# Patient Record
Sex: MALE | Race: AMERICAN INDIAN OR ALASKA NATIVE | HISPANIC OR LATINO | Employment: OTHER | URBAN - METROPOLITAN AREA
[De-identification: names, ages, dates, MRNs, and addresses within clinical notes are randomized per-mention and may not be internally consistent; named-entity substitution may affect disease eponyms.]

---

## 2018-06-12 ENCOUNTER — HOSPITAL ENCOUNTER (INPATIENT)
Facility: HOSPITAL | Age: 53
LOS: 1 days | Discharge: HOME | DRG: 434 | End: 2018-06-13
Attending: HOSPITALIST | Admitting: HOSPITALIST

## 2018-06-12 DIAGNOSIS — K76.82 HEPATIC ENCEPHALOPATHY: ICD-10-CM

## 2018-06-12 LAB
ALBUMIN SERPL BCP-MCNC: 2.7 G/DL
ALP SERPL-CCNC: 113 U/L
ALT SERPL W/O P-5'-P-CCNC: 77 U/L
AMMONIA PLAS-SCNC: 62 UMOL/L
ANION GAP SERPL CALC-SCNC: 6 MMOL/L
AST SERPL-CCNC: 91 U/L
BASOPHILS # BLD AUTO: 0.05 K/UL
BASOPHILS NFR BLD: 0.8 %
BILIRUB SERPL-MCNC: 4.9 MG/DL
BUN SERPL-MCNC: 18 MG/DL
CALCIUM SERPL-MCNC: 8.8 MG/DL
CHLORIDE SERPL-SCNC: 107 MMOL/L
CO2 SERPL-SCNC: 24 MMOL/L
CREAT SERPL-MCNC: 0.9 MG/DL
DIFFERENTIAL METHOD: ABNORMAL
EOSINOPHIL # BLD AUTO: 0.2 K/UL
EOSINOPHIL NFR BLD: 3.7 %
ERYTHROCYTE [DISTWIDTH] IN BLOOD BY AUTOMATED COUNT: 14.9 %
EST. GFR  (AFRICAN AMERICAN): >60 ML/MIN/1.73 M^2
EST. GFR  (NON AFRICAN AMERICAN): >60 ML/MIN/1.73 M^2
GLUCOSE SERPL-MCNC: 104 MG/DL
HCT VFR BLD AUTO: 37.2 %
HGB BLD-MCNC: 13.6 G/DL
IMM GRANULOCYTES # BLD AUTO: 0.03 K/UL
IMM GRANULOCYTES NFR BLD AUTO: 0.5 %
INR PPP: 1.2
LYMPHOCYTES # BLD AUTO: 1.9 K/UL
LYMPHOCYTES NFR BLD: 29.6 %
MAGNESIUM SERPL-MCNC: 2.1 MG/DL
MCH RBC QN AUTO: 34.6 PG
MCHC RBC AUTO-ENTMCNC: 36.6 G/DL
MCV RBC AUTO: 95 FL
MONOCYTES # BLD AUTO: 1 K/UL
MONOCYTES NFR BLD: 15.5 %
NEUTROPHILS # BLD AUTO: 3.2 K/UL
NEUTROPHILS NFR BLD: 49.9 %
NRBC BLD-RTO: 0 /100 WBC
PHOSPHATE SERPL-MCNC: 3.5 MG/DL
PLATELET # BLD AUTO: 140 K/UL
PMV BLD AUTO: 9.3 FL
POTASSIUM SERPL-SCNC: 3.3 MMOL/L
PROT SERPL-MCNC: 6.6 G/DL
PROTHROMBIN TIME: 12.6 SEC
RBC # BLD AUTO: 3.93 M/UL
SODIUM SERPL-SCNC: 137 MMOL/L
WBC # BLD AUTO: 6.45 K/UL

## 2018-06-12 PROCEDURE — 99223 1ST HOSP IP/OBS HIGH 75: CPT | Mod: ,,, | Performed by: HOSPITALIST

## 2018-06-12 PROCEDURE — 80053 COMPREHEN METABOLIC PANEL: CPT

## 2018-06-12 PROCEDURE — 11000001 HC ACUTE MED/SURG PRIVATE ROOM

## 2018-06-12 PROCEDURE — 25000003 PHARM REV CODE 250: Performed by: HOSPITALIST

## 2018-06-12 PROCEDURE — 87086 URINE CULTURE/COLONY COUNT: CPT

## 2018-06-12 PROCEDURE — 36415 COLL VENOUS BLD VENIPUNCTURE: CPT

## 2018-06-12 PROCEDURE — 85025 COMPLETE CBC W/AUTO DIFF WBC: CPT

## 2018-06-12 PROCEDURE — 83735 ASSAY OF MAGNESIUM: CPT

## 2018-06-12 PROCEDURE — 85610 PROTHROMBIN TIME: CPT

## 2018-06-12 PROCEDURE — 82140 ASSAY OF AMMONIA: CPT

## 2018-06-12 PROCEDURE — 84100 ASSAY OF PHOSPHORUS: CPT

## 2018-06-12 PROCEDURE — 87040 BLOOD CULTURE FOR BACTERIA: CPT

## 2018-06-12 RX ORDER — PANTOPRAZOLE SODIUM 40 MG/1
40 TABLET, DELAYED RELEASE ORAL DAILY
Status: DISCONTINUED | OUTPATIENT
Start: 2018-06-13 | End: 2018-06-13 | Stop reason: HOSPADM

## 2018-06-12 RX ORDER — LACTULOSE 10 G/15ML
30 SOLUTION ORAL 3 TIMES DAILY
Status: DISCONTINUED | OUTPATIENT
Start: 2018-06-12 | End: 2018-06-13 | Stop reason: HOSPADM

## 2018-06-12 RX ORDER — ACETAMINOPHEN 500 MG
500 TABLET ORAL DAILY PRN
COMMUNITY

## 2018-06-12 RX ORDER — PROPRANOLOL HYDROCHLORIDE 20 MG/1
20 TABLET ORAL DAILY
COMMUNITY

## 2018-06-12 RX ORDER — LACTULOSE 10 G/15ML
30 SOLUTION ORAL; RECTAL DAILY
COMMUNITY

## 2018-06-12 RX ORDER — POTASSIUM CHLORIDE 20 MEQ/1
40 TABLET, EXTENDED RELEASE ORAL ONCE
Status: COMPLETED | OUTPATIENT
Start: 2018-06-12 | End: 2018-06-12

## 2018-06-12 RX ORDER — PANTOPRAZOLE SODIUM 40 MG/1
40 TABLET, DELAYED RELEASE ORAL DAILY
COMMUNITY

## 2018-06-12 RX ADMIN — RIFAXIMIN 550 MG: 550 TABLET ORAL at 12:06

## 2018-06-12 RX ADMIN — POTASSIUM CHLORIDE 40 MEQ: 1500 TABLET, EXTENDED RELEASE ORAL at 06:06

## 2018-06-12 RX ADMIN — LACTULOSE 30 G: 20 SOLUTION ORAL at 08:06

## 2018-06-12 RX ADMIN — LACTULOSE 30 G: 20 SOLUTION ORAL at 02:06

## 2018-06-12 NOTE — PLAN OF CARE
Problem: Patient Care Overview  Goal: Plan of Care Review  Outcome: Ongoing (interventions implemented as appropriate)  Plan of care reviewed and updated. Pt AA+O. Pt's pain is managed with the medication ordered at this time. Pt's VS are as charted.  No falls this shift. Pt is oriented to room and call system. Will continue to [facility].

## 2018-06-12 NOTE — H&P
Hospital Medicine  History and Physical Exam    Team: [facility]OS[facility] MED  Maco Ascencio MD  Admit Date: 6/12/2018  VALERIA 6/14/2018  Principal Problem:  Hepatic encephalopathy   Patient information was obtained from patient, spouse/SO, past medical records and ER records.   Primary care Physician: Primary Doctor No  Code status: Full Code    HPI:     52 y/o M with a PMH Of ETOH cirrhosis with TIPS placement presents as a transfer from our [facility] of [facility] in [place], [place] where he presented with altered mental status and confusion. According to his wife he left [place] on June 2nd and his crew noticed him to be more confused when she talk to him on the phone. He then instructed him to head to shore so he could be admitted in a Hospital. His crew landed in [facility] where he was taken to [facility] [place] [facility] in [place], [place]. He has not been compliant with [place]ulose [place] sea.     He has been a  for the past 35 years but has not been working after he was hospitalized last February for a massive esophageal Bleed in [facility] in [place], [place] where he currently resides. He underwent emergent EGD as well as a TIPS procedure. He was discharged with Propanolol daily, Lactulose and Pantoprazole. He reports he has never had abdominal swelling and has never been tapped. He denies any fevers, chills or abdominal pain. Reports feeling much better after taking his lactulose. He is followed by Dr. Enriquez in [facility] in [place], [place]. He was told he would need to be sober for at least 6 months prior to being considered for transplant.       Past Medical History: Patient has no past medical history on file.    Past Surgical History: Patient has no past surgical history on file.    Social History: Patient reports that he has quit smoking. His smoking use included Cigarettes. He has quit using smokeless tobacco.    Family History: family history is not on  file.    Medications:   Prior to Admission medications    Medication Sig Start Date End Date Taking? Authorizing Provider   acetaminophen (TYLENOL) 500 MG tablet Take 500 mg by mouth daily as needed for Pain.   Yes Historical Provider, MD   lactulose (CHRONULAC) 10 gram/15 mL solution Take 30 mLs by mouth once daily.   Yes Historical Provider, MD   pantoprazole (PROTONIX) 40 MG tablet Take 40 mg by mouth once daily.   Yes Historical Provider, MD   propranolol (INDERAL) 20 MG tablet Take 20 mg by mouth once daily.   Yes Historical Provider, MD       Allergies: Patient has No Known Allergies.    ROS  Constitutional: no fever or chills  Respiratory: no cough or shortness of breath  Cardiovascular: no chest pain or palpitations  Gastrointestinal: no nausea or vomiting, no abdominal pain or change in bowel habits  Genitourinary: no hematuria or dysuria  Integument/Breast: no rash or pruritis  Hematologic/Lymphatic: no easy bruising or lymphadenopathy  Musculoskeletal: no arthralgias or myalgias  Neurological: no seizures or tremors  Behavioral/Psych: no depression or anxiety    PEx  Temp:  [96.9 °F (36.1 °C)-98.3 °F (36.8 °C)]   Pulse:  [61-75]   Resp:  [16-18]   BP: (121-149)/(62-79)   SpO2:  [99 %-100 %]   There is no height or weight on file to calculate BMI.     General appearance: no distress, non toxic   Mental status: Alert and oriented x 3  HEENT:  conjunctivae/corneas clear, PERRL  Neck: supple, thyroid not enlarged  Pulm:   normal respiratory effort, CTA B, no c/w/r  Card: RRR, S1, S2 normal, no murmur, click, rub or gallop  Abd: soft, NT, ND, BS present; no masses, no organomegaly  Ext: no c/c/e  Pulses: 2+, symmetric  Skin: color, texture, turgor normal. No rashes or lesions  Neuro: CN II-XII grossly intact, no focal numbness or weakness, normal strength and tone         Intake/Output Summary (Last 24 hours) at 06/12/18 8188  Last data filed at 06/12/18 1735   Gross per 24 hour   Intake             1440 ml    Output                0 ml   Net             1440 ml       Recent Labs  Lab 06/12/18  1030   WBC 6.45   HGB 13.6*   HCT 37.2*   *       Recent Labs  Lab 06/12/18  1030      K 3.3*      CO2 24   BUN 18   CREATININE 0.9      CALCIUM 8.8   MG 2.1   PHOS 3.5       Recent Labs  Lab 06/12/18  1030   ALKPHOS 113   ALT 77*   AST 91*   ALBUMIN 2.7*   PROT 6.6   BILITOT 4.9*      No results for input(s): POCTGLUCOSE in the last 168 hours.  No results for input(s): CPK, CPKMB, MB, TROPONINI in the last 72 hours.    No results for input(s): LACTATE in the last 72 hours.     Active Hospital Problems    Diagnosis  POA    *Hepatic encephalopathy [K72.90]  Yes     Priority: 1 - High      Resolved Hospital Problems    Diagnosis Date Resolved POA   No resolved problems to display.       Overview    52 yo M with a PMH of ETOH cirrhosis, Esophageal bleeding presents from OSH with confusion and altered mental status after non compliance with lactulose    Assessment and Plan for Problems addressed today:    Hepatic encephalopathy  - most likely due to non compliance with lactulose  - Ammonia levels 62 on admission  - restart lactulose   - no asterixis on physical examination    Decompensated Liver Cirrhosis  - most likely due to ETOH  - s/p TIPS on 02/2018  - US Liver for tips revision  - Bilirubin at baseline  - calculate MELD Score based on INR tomorrow  - Hepatology consult  - cont PPI for hx of EV bleed    Hypokalemia  - supplemented with po potassium       DVT PPx:   SAROJ/SCD  Early mobilization      Provider  Maco Arevalo MD  Staff Hospitalist  Department of Hospital Medicine  [facility]   [phone]

## 2018-06-12 NOTE — PLAN OF CARE
Please call extension [phone] (if nobody answers, this will flip to a beeper, so put in your call back number) upon patient arrival to floor for Hospital Medicine admit team assignment and for additional admit orders for the patient.  Do not page the attending, staff physician associate with the patient on arrival (may not be in-house at the time of arrival).  Rather, always call [phone] to reach the triage physician for orders and team assignment.        Outside Transfer Acceptance Note     Patients name:      Transferring Physician or Mid-Level provider/Clinic giving report:        Accepting Physician for admission to hospital: Patricio Duff MD        Date of acceptance:  06/12/2018       Reason for transfer:  hepatology availability     Report from Physician/Mid-Level Provider:    HPI: 53M with cirrosis and TIPS presented to [facility] ED for increasing confusion.  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  NH3 158.  He is alert but slowed and confused.  All of his care is in [place].    VS: T 98.3, HR 63, R 16, 100% RA, 149/79    Labs: NH3 158, bili 5.7, K 2.9, lipase 296    Radiographs: CT abdomen with cirrhotic liver w/ patent TIPS, gallstones in gallbladder without biliary ductal dilation    To Do List upon arrival:  Treat with lactulose, trend CMP (baseline unknown)     Please call extension [phone] (if nobody answers, this will flip to a beeper, so put in your call back number) upon patient arrival to floor for Hospital Medicine admit team assignment and for additional admit orders for the patient.  Do not page the attending, staff physician associate with the patient on arrival (may not be in-house at the time of arrival).  Rather, always call [phone] to reach the triage physician for orders and team assignment.

## 2018-06-12 NOTE — NURSING
Pt arrived to room  via stetcher from [facility]. Pt AAO x 4. Pt denies pain. . Pt denies nausea. .  Pt oriented to room and call system . Will continue to monitor .

## 2018-06-12 NOTE — NURSING
Received phone call from  of the [facility] patient has positive blood culture for gram positive cocci. Hospital faxing over results. Dr keller to notify

## 2018-06-13 VITALS
RESPIRATION RATE: 16 BRPM | SYSTOLIC BLOOD PRESSURE: 119 MMHG | TEMPERATURE: 96 F | HEART RATE: 78 BPM | OXYGEN SATURATION: 100 % | DIASTOLIC BLOOD PRESSURE: 64 MMHG

## 2018-06-13 PROBLEM — K72.90 DECOMPENSATED HEPATIC CIRRHOSIS: Chronic | Status: ACTIVE | Noted: 2018-06-13

## 2018-06-13 PROBLEM — K74.60 DECOMPENSATED HEPATIC CIRRHOSIS: Chronic | Status: ACTIVE | Noted: 2018-06-13

## 2018-06-13 LAB
ALBUMIN SERPL BCP-MCNC: 2.4 G/DL
ALP SERPL-CCNC: 130 U/L
ALT SERPL W/O P-5'-P-CCNC: 74 U/L
ANION GAP SERPL CALC-SCNC: 5 MMOL/L
AST SERPL-CCNC: 90 U/L
BACTERIA UR CULT: NORMAL
BACTERIA UR CULT: NORMAL
BASOPHILS # BLD AUTO: 0.03 K/UL
BASOPHILS NFR BLD: 0.6 %
BILIRUB SERPL-MCNC: 3 MG/DL
BUN SERPL-MCNC: 12 MG/DL
CALCIUM SERPL-MCNC: 8.7 MG/DL
CHLORIDE SERPL-SCNC: 109 MMOL/L
CO2 SERPL-SCNC: 24 MMOL/L
CREAT SERPL-MCNC: 0.8 MG/DL
CRP SERPL-MCNC: 3.78 MG/L
DIFFERENTIAL METHOD: ABNORMAL
EOSINOPHIL # BLD AUTO: 0.2 K/UL
EOSINOPHIL NFR BLD: 3 %
ERYTHROCYTE [DISTWIDTH] IN BLOOD BY AUTOMATED COUNT: 14.8 %
EST. GFR  (AFRICAN AMERICAN): >60 ML/MIN/1.73 M^2
EST. GFR  (NON AFRICAN AMERICAN): >60 ML/MIN/1.73 M^2
GLUCOSE SERPL-MCNC: 136 MG/DL
HCT VFR BLD AUTO: 32.4 %
HGB BLD-MCNC: 12 G/DL
IMM GRANULOCYTES # BLD AUTO: 0.03 K/UL
IMM GRANULOCYTES NFR BLD AUTO: 0.6 %
INR PPP: 1.2
LACTATE SERPL-SCNC: 1.6 MMOL/L
LYMPHOCYTES # BLD AUTO: 1.6 K/UL
LYMPHOCYTES NFR BLD: 30.6 %
MAGNESIUM SERPL-MCNC: 2.1 MG/DL
MCH RBC QN AUTO: 35.2 PG
MCHC RBC AUTO-ENTMCNC: 37 G/DL
MCV RBC AUTO: 95 FL
MONOCYTES # BLD AUTO: 0.9 K/UL
MONOCYTES NFR BLD: 16.3 %
NEUTROPHILS # BLD AUTO: 2.6 K/UL
NEUTROPHILS NFR BLD: 48.9 %
NRBC BLD-RTO: 0 /100 WBC
PHOSPHATE SERPL-MCNC: 2.9 MG/DL
PLATELET # BLD AUTO: 109 K/UL
PMV BLD AUTO: 9.8 FL
POTASSIUM SERPL-SCNC: 3.5 MMOL/L
PROCALCITONIN SERPL IA-MCNC: 0.51 NG/ML
PROT SERPL-MCNC: 6 G/DL
PROTHROMBIN TIME: 12 SEC
RBC # BLD AUTO: 3.41 M/UL
SODIUM SERPL-SCNC: 138 MMOL/L
WBC # BLD AUTO: 5.29 K/UL

## 2018-06-13 PROCEDURE — 84100 ASSAY OF PHOSPHORUS: CPT

## 2018-06-13 PROCEDURE — 83735 ASSAY OF MAGNESIUM: CPT

## 2018-06-13 PROCEDURE — 80053 COMPREHEN METABOLIC PANEL: CPT

## 2018-06-13 PROCEDURE — 84145 PROCALCITONIN (PCT): CPT

## 2018-06-13 PROCEDURE — 85610 PROTHROMBIN TIME: CPT

## 2018-06-13 PROCEDURE — 86141 C-REACTIVE PROTEIN HS: CPT

## 2018-06-13 PROCEDURE — 99238 HOSP IP/OBS DSCHRG MGMT 30/<: CPT | Mod: ,,, | Performed by: HOSPITALIST

## 2018-06-13 PROCEDURE — 85025 COMPLETE CBC W/AUTO DIFF WBC: CPT

## 2018-06-13 PROCEDURE — 25000003 PHARM REV CODE 250: Performed by: HOSPITALIST

## 2018-06-13 PROCEDURE — 83605 ASSAY OF LACTIC ACID: CPT

## 2018-06-13 PROCEDURE — 99223 1ST HOSP IP/OBS HIGH 75: CPT | Mod: ,,, | Performed by: INTERNAL MEDICINE

## 2018-06-13 RX ADMIN — PANTOPRAZOLE SODIUM 40 MG: 40 TABLET, DELAYED RELEASE ORAL at 08:06

## 2018-06-13 RX ADMIN — LACTULOSE 30 G: 20 SOLUTION ORAL at 08:06

## 2018-06-13 NOTE — DISCHARGE SUMMARY
Discharge Summary  Hospital Medicine    Attending Provider on Discharge: Maco Ascencio MD  Hospital Medicine Team: [facility]  Date of Admission:  6/12/2018     Date of Discharge:  6/13/2018  Code status: Full Code    Active Hospital Problems    Diagnosis  POA    *Hepatic encephalopathy [K72.90]  Yes     Priority: 1 - High    Decompensated hepatic cirrhosis [K72.90]  Yes     Chronic      Resolved Hospital Problems    Diagnosis Date Resolved POA   No resolved problems to display.        HPI  52 y/o M with a PMH Of ETOH cirrhosis with TIPS placement presents as a transfer from our lady of [facility] in [place], [place] where he presented with altered mental status and confusion. According to his wife he left [place] on June 2nd and his crew noticed him to be more confused when she talk to him on the phone. He then instructed him to head to shore so he could be admitted in a Hospital. His crew landed in [facility] where he was taken to [facility] in [place], [place]. He has not been compliant with his lactulose while at sea.      He has been a  for the past 35 years but has not been working after he was hospitalized last February for a massive esophageal Bleed in [facility] in [place], [place] where he currently resides. He underwent emergent EGD as well as a TIPS procedure. He was discharged with Propanolol daily, Lactulose and Pantoprazole. He reports he has never had abdominal swelling and has never been tapped. He denies any fevers, chills or abdominal pain. Reports feeling much better after taking his lactulose. He is followed by Dr. Enriquez in [facility] in [place], [place]. He was told he would need to be sober for at least 6 months prior to being considered for transplant.     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 He was  transferred from [facility] in [place], [place]. US abdomen with doppler showed normal TIPS function and patent veins. Altered mental status quickly improved with lactulose. Stable for discharge and follow up with Primary Liver doctor in [place], [place] where he resides.         Recent Labs  Lab 06/12/18 1030 06/13/18  0548   WBC 6.45 5.29   HGB 13.6* 12.0*   HCT 37.2* 32.4*   * 109*       Recent Labs  Lab 06/12/18 1030 06/13/18  0549    138   K 3.3* 3.5    109   CO2 24 24   BUN 18 12   CREATININE 0.9 0.8    136*   CALCIUM 8.8 8.7   MG 2.1 2.1   PHOS 3.5 2.9       Recent Labs  Lab 06/12/18 1030 06/12/18 2000 06/13/18  0548 06/13/18  0549   ALKPHOS 113  --   --  130   ALT 77*  --   --  74*   AST 91*  --   --  90*   ALBUMIN 2.7*  --   --  2.4*   PROT 6.6  --   --  6.0   BILITOT 4.9*  --   --  3.0*   INR  --  1.2 1.2  --       No results for input(s): POCTGLUCOSE in the last 168 hours.  No results for input(s): CPK, CPKMB, MB, TROPONINI in the last 72 hours.    Recent Labs      06/13/18   0549   LACTATE  1.6        Procedures: none     Consultants: hepatology     Discharge Medication List as of 6/13/2018  1:07 PM      CONTINUE these medications which have NOT CHANGED    Details   acetaminophen (TYLENOL) 500 MG tablet Take 500 mg by mouth daily as needed for Pain., Historical Med      lactulose (CHRONULAC) 10 gram/15 mL solution Take 30 mLs by mouth once daily., Historical Med      pantoprazole (PROTONIX) 40 MG tablet Take 40 mg by mouth once daily., Historical Med      propranolol (INDERAL) 20 MG tablet Take 20 mg by mouth once daily., Historical Med             Discharge Diet: regular diet     Activity: activity as tolerated    Discharge Condition: Stable    Disposition: Home or Self Care    Tests pending at the time of discharge: none       Time spent  on the discharge of the patient including review of hospital course with the patient. reviewing discharge medications and  arranging follow-up care 35 minutes     Discharge examination Patient was seen and examined on the date of discharge and determined to be suitable for discharge.    Discharge plan and follow up:      No future appointments.    Provider    Maco Arevalo MD  Staff Hospitalist  Department of Hospital Medicine  [facility]   [phone]

## 2018-06-13 NOTE — CONSULTS
[facility]  Hepatology  Consult Note    Patient Name: Lester Whitt  MRN: [id number]  Admission Date: 6/12/2018  Hospital Length of Stay: 1 days  Attending Provider: Maco Ascencio MD   Primary Care Physician: Primary Doctor No  Principal Problem:Hepatic encephalopathy    Inpatient consult to Hepatology  Consult performed by: JUAN HOFF  Consult ordered by: MACO ASCENCIO        Subjective:     Transplant status: No    HPI:  This is 52 y/o male hx of Etoh cirrhosis s/p TIPS who is presenting as transfer from our [facility] for AMS / HE.   [place] and has been noncompliant with lactulose. He is a . He lives in [place], [place].   He had tips placed for large EV bleeding. No hx of paracentesis. He is followed by Dr. Enriquez in [facility] in [place], [place]. He was told he would need to be sober for at least 6 months prior to being considered for transplant.   Since arrival, OSH has reported positive culture with GPCs.   This morning he is feeling much better. His confusion is better. He is ready to go home.  He will follow up soon with his primary liver doctor.    Review of Systems   Constitutional: Negative for chills and fever.   HENT: Negative for facial swelling, mouth sores and trouble swallowing.    Eyes: Negative for pain and redness.   Respiratory: Negative for cough and shortness of breath.    Cardiovascular: Negative for chest pain and leg swelling.   Gastrointestinal: Negative for abdominal distention and abdominal pain.   Genitourinary: Negative for dysuria and hematuria.   Musculoskeletal: Negative for gait problem and neck stiffness.   Skin: Negative for rash and wound.   Neurological: Negative for seizures and headaches.   Psychiatric/Behavioral: Positive for confusion. Negative for hallucinations.       History reviewed. No pertinent past medical history.    History reviewed. No pertinent surgical history.    Family  history of liver disease: No    Review of patient's allergies indicates:  No Known Allergies    Social History Main Topics    Smoking status: Former Smoker     Types: Cigarettes    Smokeless tobacco: Former User    Alcohol use Not on file    Drug use: Unknown    Sexual activity: Not on file       Prescriptions Prior to Admission   Medication Sig Dispense Refill Last Dose    acetaminophen (TYLENOL) 500 MG tablet Take 500 mg by mouth daily as needed for Pain.       lactulose (CHRONULAC) 10 gram/15 mL solution Take 30 mLs by mouth once daily.       pantoprazole (PROTONIX) 40 MG tablet Take 40 mg by mouth once daily.       propranolol (INDERAL) 20 MG tablet Take 20 mg by mouth once daily.          Objective:     Vital Signs (Most Recent):  Temp: 96.1 °F (35.6 °C) (06/13/18 1137)  Pulse: 78 (06/13/18 1137)  Resp: 16 (06/13/18 1137)  BP: 119/64 (06/13/18 1137)  SpO2: 100 % (06/13/18 1137) Vital Signs (24h Range):  Temp:  [96.1 °F (35.6 °C)-97.5 °F (36.4 °C)] 96.1 °F (35.6 °C)  Pulse:  [68-92] 78  Resp:  [16-18] 16  SpO2:  [94 %-100 %] 100 %  BP: (107-138)/(57-73) 119/64        There is no height or weight on file to calculate BMI.    Physical Exam   Constitutional: He is oriented to person, place, and time. He appears well-developed and well-nourished. No distress.   HENT:   Head: Normocephalic and atraumatic.   Eyes: Conjunctivae and EOM are normal.   Neck: Neck supple. No thyromegaly present.   Cardiovascular: Normal rate, regular rhythm and intact distal pulses.    Pulmonary/Chest: Effort normal and breath sounds normal. No respiratory distress.   Abdominal: Soft. He exhibits no distension. There is no tenderness.   Musculoskeletal: Normal range of motion. He exhibits no tenderness.   Neurological: He is alert and oriented to person, place, and time. No cranial nerve deficit.   Skin: Skin is warm and dry. No rash noted.   Psychiatric: He has a normal mood and affect. His behavior is normal.   Vitals  reviewed.      MELD-Na score: 13 at 6/13/2018  5:49 AM  MELD score: 13 at 6/13/2018  5:49 AM  Calculated from:  Serum Creatinine: 0.8 mg/dL (Rounded to 1) at 6/13/2018  5:49 AM  Serum Sodium: 138 mmol/L (Rounded to 137) at 6/13/2018  5:49 AM  Total Bilirubin: 3 mg/dL at 6/13/2018  5:49 AM  INR(ratio): 1.2 at 6/13/2018  5:48 AM  Age: 53 years    Significant Labs:  CBC:   Recent Labs  Lab 06/13/18  0548   WBC 5.29   RBC 3.41*   HGB 12.0*   HCT 32.4*   *     BMP:   Recent Labs  Lab 06/13/18  0549   *      K 3.5      CO2 24   BUN 12   CREATININE 0.8   CALCIUM 8.7     CMP:   Recent Labs  Lab 06/13/18  0549   *   CALCIUM 8.7   ALBUMIN 2.4*   PROT 6.0      K 3.5   CO2 24      BUN 12   CREATININE 0.8   ALKPHOS 130   ALT 74*   AST 90*   BILITOT 3.0*     Coagulation:   Recent Labs  Lab 06/13/18  0548   INR 1.2       Significant Imaging:  Labs: Reviewed    Assessment/Plan:     * Hepatic encephalopathy    As above        Decompensated hepatic cirrhosis    52 y/o male hx of Etoh cirrhosis s/p TIPS who [facility]   He is quickly improving.  OSH blood cultures showing GPC, will need to await final speciation, but possible contaminant.  bloodculture here negative.    Recs:  Follow up blood culture from OSH  Continue lactulose and rifaximin    Plan to discharge home today and close follow up with primary liver doctor in [place]        U/S liver dopplers , ensure patency.    Thank you for your consult. I will follow-up with patient. Please contact us if you have any additional questions.    Bryan Virk MD  Hepatology  [facility]

## 2018-06-13 NOTE — PROGRESS NOTES
Pt AA0x3 and VSS.  Two side rails up, bed locked, call light within reach. No falls noted as these precautions remain. Pt free of skin breakdown as the pt moves well independently. Hourly rounds made and no complaints at this time noted. Will resume with plan of care.

## 2018-06-13 NOTE — PHARMACY MED REC
"Admission Medication Reconciliation - Pharmacy Consult Note    The home medication history was taken by Ct Garcia, Pharmacy Tech.     You may go to "Admission" then "Reconcile Home Medications" tabs to review and/or act upon these items.     Potentially problematic discrepancies with current MAR  o Patient IS taking the following which was not ordered upon admit  o Propranolol 20mg PO daily (unclear why not BID)    Cele Norman, PharmD  [id number]    .    .            "

## 2018-06-13 NOTE — PLAN OF CARE
[id number] - [place], [place] - [address] [id number] [facility] [address]  Phone: [phone] Fax: [phone]      Payor: /   Admitting staff met with patient and family       06/13/18 1153   Discharge Assessment   Assessment Type Discharge Planning Assessment   Confirmed/corrected address and phone number on facesheet? Yes   Assessment information obtained from? Patient   Expected Length of Stay (days) 3   Communicated expected length of stay with patient/caregiver yes   Prior to hospitilization cognitive status: Unable to Assess   Prior to hospitalization functional status: Independent   Current cognitive status: Alert/Oriented   Current Functional Status: Independent   Lives With spouse   Able to Return to Prior Arrangements yes   Is patient able to care for self after discharge? Yes   Who are your caregiver(s) and their phone number(s)? (Amy Whitt  spouse [phone])   Patient's perception of discharge disposition home or selfcare   Patient currently receives any other outside agency services? No   Equipment Currently Used at Home none   Does the patient have transportation home? Yes   Transportation Available family or friend will provide   Discharge Plan A Home with family   Discharge Plan B Home with family   Patient/Family In Agreement With Plan yes

## 2018-06-13 NOTE — SUBJECTIVE & OBJECTIVE
Review of Systems   Constitutional: Negative for chills and fever.   HENT: Negative for facial swelling, mouth sores and trouble swallowing.    Eyes: Negative for pain and redness.   Respiratory: Negative for cough and shortness of breath.    Cardiovascular: Negative for chest pain and leg swelling.   Gastrointestinal: Negative for abdominal distention and abdominal pain.   Genitourinary: Negative for dysuria and hematuria.   Musculoskeletal: Negative for gait problem and neck stiffness.   Skin: Negative for rash and wound.   Neurological: Negative for seizures and headaches.   Psychiatric/Behavioral: Positive for confusion. Negative for hallucinations.       History reviewed. No pertinent past medical history.    History reviewed. No pertinent surgical history.    Family history of liver disease: No    Review of patient's allergies indicates:  No Known Allergies    Social History Main Topics    Smoking status: Former Smoker     Types: Cigarettes    Smokeless tobacco: Former User    Alcohol use Not on file    Drug use: Unknown    Sexual activity: Not on file       Prescriptions Prior to Admission   Medication Sig Dispense Refill Last Dose    acetaminophen (TYLENOL) 500 MG tablet Take 500 mg by mouth daily as needed for Pain.       lactulose (CHRONULAC) 10 gram/15 mL solution Take 30 mLs by mouth once daily.       pantoprazole (PROTONIX) 40 MG tablet Take 40 mg by mouth once daily.       propranolol (INDERAL) 20 MG tablet Take 20 mg by mouth once daily.          Objective:     Vital Signs (Most Recent):  Temp: 96.1 °F (35.6 °C) (06/13/18 1137)  Pulse: 78 (06/13/18 1137)  Resp: 16 (06/13/18 1137)  BP: 119/64 (06/13/18 1137)  SpO2: 100 % (06/13/18 1137) Vital Signs (24h Range):  Temp:  [96.1 °F (35.6 °C)-97.5 °F (36.4 °C)] 96.1 °F (35.6 °C)  Pulse:  [68-92] 78  Resp:  [16-18] 16  SpO2:  [94 %-100 %] 100 %  BP: (107-138)/(57-73) 119/64        There is no height or weight on file to calculate BMI.    Physical  Exam   Constitutional: He is oriented to person, place, and time. He appears well-developed and well-nourished. No distress.   HENT:   Head: Normocephalic and atraumatic.   Eyes: Conjunctivae and EOM are normal.   Neck: Neck supple. No thyromegaly present.   Cardiovascular: Normal rate, regular rhythm and intact distal pulses.    Pulmonary/Chest: Effort normal and breath sounds normal. No respiratory distress.   Abdominal: Soft. He exhibits no distension. There is no tenderness.   Musculoskeletal: Normal range of motion. He exhibits no tenderness.   Neurological: He is alert and oriented to person, place, and time. No cranial nerve deficit.   Skin: Skin is warm and dry. No rash noted.   Psychiatric: He has a normal mood and affect. His behavior is normal.   Vitals reviewed.      MELD-Na score: 13 at 6/13/2018  5:49 AM  MELD score: 13 at 6/13/2018  5:49 AM  Calculated from:  Serum Creatinine: 0.8 mg/dL (Rounded to 1) at 6/13/2018  5:49 AM  Serum Sodium: 138 mmol/L (Rounded to 137) at 6/13/2018  5:49 AM  Total Bilirubin: 3 mg/dL at 6/13/2018  5:49 AM  INR(ratio): 1.2 at 6/13/2018  5:48 AM  Age: 53 years    Significant Labs:  CBC:   Recent Labs  Lab 06/13/18  0548   WBC 5.29   RBC 3.41*   HGB 12.0*   HCT 32.4*   *     BMP:   Recent Labs  Lab 06/13/18  0549   *      K 3.5      CO2 24   BUN 12   CREATININE 0.8   CALCIUM 8.7     CMP:   Recent Labs  Lab 06/13/18  0549   *   CALCIUM 8.7   ALBUMIN 2.4*   PROT 6.0      K 3.5   CO2 24      BUN 12   CREATININE 0.8   ALKPHOS 130   ALT 74*   AST 90*   BILITOT 3.0*     Coagulation:   Recent Labs  Lab 06/13/18  0548   INR 1.2       Significant Imaging:  Labs: Reviewed

## 2018-06-13 NOTE — ASSESSMENT & PLAN NOTE
54 y/o male hx of Etoh cirrhosis s/p TIPS who is presenting as transfer from our [facility] / HE.   He is quickly improving.  OSH blood cultures showing GPC, will need to await final speciation, but possible contaminant.  bloodculture here negative.    Recs:  Follow up blood culture from OSH  Continue lactulose and rifaximin    Plan to discharge home today and close follow up with primary liver doctor in [place]

## 2018-06-13 NOTE — HPI
This is 54 y/o male hx of Etoh cirrhosis s/p TIPS who is [facility]   [place] has been [place] sea on his boat and has been noncompliant with lactulose. He is a . He lives in [place], [place].   He had tips placed for large EV bleeding. No hx of paracentesis. He is followed by Dr. Enriquez in [facility] in [place], [place]. He was told he would need to be sober for at least 6 months prior to being considered for transplant.   Since arrival, OSH has reported positive culture with GPCs.   This morning he is feeling much better. His confusion is better. He is ready to go home.  He will follow up soon with his primary liver doctor.

## 2018-06-17 LAB — BACTERIA BLD CULT: NORMAL

## 2019-03-14 ENCOUNTER — HOSPITAL ENCOUNTER (OUTPATIENT)
Dept: HOSPITAL 90 - DAH | Age: 54
Discharge: HOME | End: 2019-03-14
Attending: INTERNAL MEDICINE
Payer: COMMERCIAL

## 2019-03-14 VITALS — SYSTOLIC BLOOD PRESSURE: 83 MMHG | DIASTOLIC BLOOD PRESSURE: 45 MMHG

## 2019-03-14 VITALS — DIASTOLIC BLOOD PRESSURE: 67 MMHG | SYSTOLIC BLOOD PRESSURE: 110 MMHG

## 2019-03-14 VITALS — SYSTOLIC BLOOD PRESSURE: 130 MMHG | DIASTOLIC BLOOD PRESSURE: 73 MMHG

## 2019-03-14 VITALS — SYSTOLIC BLOOD PRESSURE: 81 MMHG | DIASTOLIC BLOOD PRESSURE: 45 MMHG

## 2019-03-14 VITALS — DIASTOLIC BLOOD PRESSURE: 47 MMHG | SYSTOLIC BLOOD PRESSURE: 93 MMHG

## 2019-03-14 VITALS — WEIGHT: 212 LBS | HEIGHT: 69 IN | BODY MASS INDEX: 31.4 KG/M2

## 2019-03-14 VITALS — SYSTOLIC BLOOD PRESSURE: 86 MMHG | DIASTOLIC BLOOD PRESSURE: 47 MMHG

## 2019-03-14 DIAGNOSIS — Z79.01: ICD-10-CM

## 2019-03-14 DIAGNOSIS — Z79.899: ICD-10-CM

## 2019-03-14 DIAGNOSIS — B18.2: ICD-10-CM

## 2019-03-14 DIAGNOSIS — Z79.84: ICD-10-CM

## 2019-03-14 DIAGNOSIS — K21.9: ICD-10-CM

## 2019-03-14 DIAGNOSIS — I85.10: ICD-10-CM

## 2019-03-14 DIAGNOSIS — K74.60: ICD-10-CM

## 2019-03-14 DIAGNOSIS — K31.89: ICD-10-CM

## 2019-03-14 DIAGNOSIS — Z12.11: Primary | ICD-10-CM

## 2019-03-14 DIAGNOSIS — Z98.890: ICD-10-CM

## 2019-03-14 DIAGNOSIS — I86.4: ICD-10-CM

## 2019-03-14 PROCEDURE — 45378 DIAGNOSTIC COLONOSCOPY: CPT

## 2019-03-14 PROCEDURE — 82948 REAGENT STRIP/BLOOD GLUCOSE: CPT

## 2019-03-14 PROCEDURE — 43239 EGD BIOPSY SINGLE/MULTIPLE: CPT

## 2019-03-14 NOTE — NUR
PT TOLERATED PROCEDURE WELL, NO C/O OF ABDOMINAL PAIN, STATES ONLY BLOATING AND MILD 
DISCOMFORT, INSTRUCTED TO AMBULATE AT HOME AS TOLERATED TO HELP RELEASE AIR/ GAS IN THE 
ABDOMEN. POST CARE INSTRUCTIONS GIVEN TO PT AND HIS WIFE, BOTH VERBALIZED UNDERSTANDING. PT 
WHEELED OUT TO CAR, WIFE DROVE PT HOME.